# Patient Record
(demographics unavailable — no encounter records)

---

## 2025-03-21 NOTE — PHYSICAL EXAM
[Well-appearing] : well-appearing [Normocephalic] : normocephalic [No dysmorphic facial features] : no dysmorphic facial features [No ocular abnormalities] : no ocular abnormalities [Neck supple] : neck supple [No deformities] : no deformities [Alert] : alert [Well related, good eye contact] : well related, good eye contact [Conversant] : conversant [Normal speech and language] : normal speech and language [Follows instructions well] : follows instructions well [Pupils reactive to light and accommodation] : pupils reactive to light and accommodation [Full extraocular movements] : full extraocular movements [Saccadic and smooth pursuits intact] : saccadic and smooth pursuits intact [No nystagmus] : no nystagmus [Normal facial sensation to light touch] : normal facial sensation to light touch [No facial asymmetry or weakness] : no facial asymmetry or weakness [Gross hearing intact] : gross hearing intact [Equal palate elevation] : equal palate elevation [Good shoulder shrug] : good shoulder shrug [Normal tongue movement] : normal tongue movement [Midline tongue, no fasciculations] : midline tongue, no fasciculations [Normal axial and appendicular muscle tone] : normal axial and appendicular muscle tone [Gets up on table without difficulty] : gets up on table without difficulty [No pronator drift] : no pronator drift [Normal finger tapping and fine finger movements] : normal finger tapping and fine finger movements [No abnormal involuntary movements] : no abnormal involuntary movements [5/5 strength in proximal and distal muscles of arms and legs] : 5/5 strength in proximal and distal muscles of arms and legs [Able to walk on toes] : able to walk on toes [2+ biceps] : 2+ biceps [Triceps] : triceps [Knee jerks] : knee jerks [Ankle jerks] : ankle jerks [No ankle clonus] : no ankle clonus [Bilaterally] : bilaterally [Localizes LT and temperature] : localizes LT and temperature [No dysmetria on FTNT] : no dysmetria on FTNT [Good walking balance] : good walking balance [Normal gait] : normal gait [Able to tandem well] : able to tandem well [Negative Romberg] : negative Romberg

## 2025-03-27 NOTE — HISTORY OF PRESENT ILLNESS
[FreeTextEntry1] : STEVE APARICIO is a 7 year old girl who presents for initial evaluation for a possible seizure.    One month ago, she was at home and told her mom that her stomach hurt.  She then had an episode of eye rolling back and became limp and unresponsive for a minute or so.  No stiffening/shaking, tongue bite, or incontinence.  She recovered within a minute or so afterwards.  EMS arrived and obtained d-stick which was normal.  Prior to this, she had a typical day, and mom denies any skipped meals.  This is her only seizure-like episode.  Born FT, , uncomplicated  She has a history of mild speech delay.  She is in second grade and has a SEIT, receives ST  FHx: Negative for epilepsy.  Mom has a h/o recurrent fainting episodes

## 2025-05-18 NOTE — HISTORY OF PRESENT ILLNESS
[FreeTextEntry1] : STEVE APARICIO is a 7-year-old girl who presents for referral after a possible seizure versus syncopal event.   Approximately 2 months ago, she was at home and told her mom that her stomach hurt. She then had an episode of eye rolling back and became limp and unresponsive for a minute or so. No stiffening/shaking, tongue bite, or incontinence. She recovered within a minute or so afterwards. EMS arrived and obtained d-stick which was normal. Prior to this, she had a typical day, and mom denies any skipped meals.  This is her only lifetime event. Since then, she has not had any significant events and has been at her baseline state of health. She does have mild developmental delay for which she has been followed. She was evaluated by neurology with a normal work up including an EEG without obvious signs of seizure.   She has never had any exertional symptoms. Specifically, family denies shortness of breath, chest pain, palpitations, dyspnea, inability to keep up with peers, syncope or presyncope symptoms.   There is a family history of pacemakers in maternal grandmother and grandfather (later in life).  There is no other significant family history of early myocardial infarction, inherited arrhythmias, sudden cardiac death, congenital heart disease, early cardiac surgery, seizures or unexplained deaths, or inherited hyperlipidemias.  Denies: shortness of breath, difficulty breathing, cyanosis, loss of consciousness, excessive sweating, swelling, recent fever or rash

## 2025-05-18 NOTE — CONSULT LETTER
[Today's Date] : [unfilled] [Name] : Name: [unfilled] [] : : ~~ [Today's Date:] : [unfilled] [Dear  ___:] : Dear Dr. [unfilled]: [Consult - Single Provider] : Thank you very much for allowing me to participate in the care of this patient. If you have any questions, please do not hesitate to contact me. [Sincerely,] : Sincerely, [DrRos  ___] : Dr. MONTES [Consult] : I had the pleasure of evaluating your patient, [unfilled]. My full evaluation follows. [FreeTextEntry4] : Concepcion Diaz MD [FreeTextEntry5] : [phone] [place] [address] [FreeTextEntry6] : [phone] [de-identified] : Orlando Echevarria MD Pediatric Cardiologist [facility] [place] [address] Tel: [phone]

## 2025-05-18 NOTE — PHYSICAL EXAM
[General Appearance - Alert] : alert [General Appearance - In No Acute Distress] : in no acute distress [General Appearance - Well Nourished] : well nourished [General Appearance - Well Developed] : well developed [General Appearance - Well-Appearing] : well appearing [Appearance Of Head] : the head was normocephalic [Facies] : there were no dysmorphic facial features [Sclera] : the conjunctiva were normal [Outer Ear] : the ears and nose were normal in appearance [Examination Of The Oral Cavity] : mucous membranes were moist and pink [Auscultation Breath Sounds / Voice Sounds] : breath sounds clear to auscultation bilaterally [Normal Chest Appearance] : the chest was normal in appearance [Apical Impulse] : quiet precordium with normal apical impulse [Heart Rate And Rhythm] : normal heart rate and rhythm [Heart Sounds] : normal S1 and S2 [No Murmur] : no murmurs  [Heart Sounds Gallop] : no gallops [Heart Sounds Pericardial Friction Rub] : no pericardial rub [Edema] : no edema [Arterial Pulses] : normal upper and lower extremity pulses with no pulse delay [Heart Sounds Click] : no clicks [Capillary Refill Test] : normal capillary refill [Abdomen Soft] : soft [Nail Clubbing] : no clubbing  or cyanosis of the fingernails [] : no rash [Demonstrated Behavior - Infant Nonreactive To Parents] : interactive [Demonstrated Behavior] : normal behavior

## 2025-05-18 NOTE — REVIEW OF SYSTEMS
[Feeling Poorly] : not feeling poorly (malaise) [Fever] : no fever [Wgt Loss (___ Lbs)] : no recent weight loss [Pallor] : not pale [Cyanosis] : no cyanosis [Edema] : no edema [Diaphoresis] : not diaphoretic [Chest Pain] : no chest pain or discomfort [Exercise Intolerance] : no persistence of exercise intolerance [Palpitations] : no palpitations [Orthopnea] : no orthopnea [Fast HR] : no tachycardia [Tachypnea] : not tachypneic [Wheezing] : no wheezing [Cough] : no cough [Shortness Of Breath] : not expressed as feeling short of breath [Vomiting] : no vomiting [Abdominal Pain] : no abdominal pain [Fainting (Syncope)] : no fainting [Seizure] : no seizures [Headache] : no headache [Dizziness] : no dizziness [Rash] : no rash [Sleep Disturbances] : ~T no sleep disturbances [Failure To Thrive] : no failure to thrive [Dec Urine Output] : no oliguria

## 2025-05-18 NOTE — CARDIOLOGY SUMMARY
[de-identified] : 5/9/2025 [FreeTextEntry1] : Sinus rate and rhythm with normal intervals. Possible LVH by voltage criteria only. Normal intervals. HR 99 BPM. No evidence of pre-excitation.  [de-identified] : 5/9/2025 [FreeTextEntry2] : Summary: 1. {S,D,S\} Situs solitus, D-ventricular looping, normally related great arteries. 2. No structural defects identified. 3. Normal function of cardiac valves. 4. Normal left ventricular size, morphology and systolic function. 5. Normal right ventricular morphology with qualitatively normal size and systolic function. 6. No pericardial effusion

## 2025-05-18 NOTE — CONSULT LETTER
[Today's Date] : [unfilled] [Name] : Name: [unfilled] [] : : ~~ [Today's Date:] : [unfilled] [Dear  ___:] : Dear Dr. [unfilled]: [Consult - Single Provider] : Thank you very much for allowing me to participate in the care of this patient. If you have any questions, please do not hesitate to contact me. [Sincerely,] : Sincerely, [DrRos  ___] : Dr. MONTES [Consult] : I had the pleasure of evaluating your patient, [unfilled]. My full evaluation follows. [FreeTextEntry5] : [phone], [place] [address] [FreeTextEntry4] : Concepcion Diaz MD [FreeTextEntry6] : [phone] [de-identified] : Orlando Echevarria MD Pediatric Cardiologist [facility] [place] [address] Tel: [phone]

## 2025-05-18 NOTE — CARDIOLOGY SUMMARY
[de-identified] : 5/9/2025 [FreeTextEntry1] : Sinus rate and rhythm with normal intervals. Possible LVH by voltage criteria only. Normal intervals. HR 99 BPM. No evidence of pre-excitation.  [de-identified] : 5/9/2025 [FreeTextEntry2] : Summary: 1. {S,D,S\} Situs solitus, D-ventricular looping, normally related great arteries. 2. No structural defects identified. 3. Normal function of cardiac valves. 4. Normal left ventricular size, morphology and systolic function. 5. Normal right ventricular morphology with qualitatively normal size and systolic function. 6. No pericardial effusion

## 2025-05-18 NOTE — DISCUSSION/SUMMARY
[PE + No Restrictions] : [unfilled] may participate in the entire physical education program without restriction, including all varsity competitive sports. [FreeTextEntry1] : STEVE APARICIO is a 7-year-old girl who presents for referral after a possible seizure versus syncopal event. Her exam and history were reassuring against cardiac pathology for her symptoms. Her ECG showed sinus rate and rhythm with normal intervals and possible LVH by voltage criteria. Her echocardiogram specifically showed normal biventricular size and function, normal function of cardiac valves, and no structural defects. Based on the reassuring work up it is unlikely that there is a cardiac cause to her event. While a definitive cause of the event may not be understood, the story and work up thus far has been normal and reassuring. In order to rule out any underlying arrhythmia will place an event monitor and follow results.   Recommendations: - Place an event monitor today to be worn for 7 days. Will follow results if abnormal will discuss next steps, if normal and no reoccurrence of the events no further cardiology follow up would be required.  - No further cardiology follow up needed, unless symptoms change or evolve, if there are additional concerns, or an increase in episodes - No restrictions to age-appropriate exertion / exercise - No SBE prophylaxis is required - Please contact our team with further questions or concerns [Needs SBE Prophylaxis] : [unfilled] does not need bacterial endocarditis prophylaxis